# Patient Record
Sex: FEMALE | NOT HISPANIC OR LATINO | Employment: UNEMPLOYED | URBAN - METROPOLITAN AREA
[De-identification: names, ages, dates, MRNs, and addresses within clinical notes are randomized per-mention and may not be internally consistent; named-entity substitution may affect disease eponyms.]

---

## 2024-01-18 PROBLEM — Z01.419 WELL WOMAN EXAM: Status: ACTIVE | Noted: 2024-01-18

## 2024-01-18 NOTE — PROGRESS NOTES
"Assessment/Plan   There are no diagnoses linked to this encounter.    Carin Grewal, APRN-CNM     Subjective   Carine Rowell is a 38 y.o. female who is here for a routine exam. Periods are {gyn period regularity:715}, lasting {numbers; 0-10:01395} days. Dysmenorrhea:{gyn dysmenorrhea:716}. Cyclic symptoms include {sys gyn cyclic sx:77656}. No intermenstrual bleeding, spotting, or discharge.    Current contraception: {contraceptive method:5051}  History of abnormal Pap smear: {yes***/no:95018::\"no\"}  History of LEEP or cryo:  {yes***/no:53921::\"no\"}  Family history of uterine or ovarian cancer: {yes***/no:43177::\"no\"}  Family history of breast cancer: {yes***/no:78485::\"no\"}  Regular self breast exam: {yes/no:63}  History of abnormal mammogram: {yes***/no:07218::\"no\"}    She has issues with lubrication: {yes***/no:17777::\"no\"}  She reports issue with orgasms: {yes***/no:35831::\"no\"}  She reports pain with sexual activity: {yes***/no:53217::\"no\"}  She reports sexual activity with: (male***/female***/both:40971::\"no\"}    Menstrual History:  OB History    No obstetric history on file.        Menarche age: ***  No LMP recorded.         ROS    Objective   There were no vitals taken for this visit.    General:   Alert and oriented x 3   Heart:  Thyroid: Regular rate, rhythm  Euthyroid, normal shape and size   Lungs:  Breast: Clear to auscultation bilaterally  Symmetrical, no skin changes/nipple discharge, redness, tenderness, no masses palpated bilaterally   Abdomen: Soft, non tender   Vulva: EGBUS normal   Vagina: Pink, normal discharge   Cervix: No CMT   Uterus: Normal shape, size   Adnexa: NT bilaterally     "

## 2024-01-30 ENCOUNTER — APPOINTMENT (OUTPATIENT)
Dept: OBSTETRICS AND GYNECOLOGY | Facility: CLINIC | Age: 39
End: 2024-01-30
Payer: COMMERCIAL

## 2024-07-18 ENCOUNTER — APPOINTMENT (OUTPATIENT)
Dept: OBSTETRICS AND GYNECOLOGY | Facility: CLINIC | Age: 39
End: 2024-07-18
Payer: COMMERCIAL

## 2024-07-18 ENCOUNTER — PATIENT MESSAGE (OUTPATIENT)
Dept: OBSTETRICS AND GYNECOLOGY | Facility: CLINIC | Age: 39
End: 2024-07-18

## 2024-07-18 VITALS
SYSTOLIC BLOOD PRESSURE: 118 MMHG | DIASTOLIC BLOOD PRESSURE: 70 MMHG | WEIGHT: 151 LBS | HEIGHT: 67 IN | BODY MASS INDEX: 23.7 KG/M2

## 2024-07-18 DIAGNOSIS — Z01.419 ENCOUNTER FOR ANNUAL ROUTINE GYNECOLOGICAL EXAMINATION: ICD-10-CM

## 2024-07-18 DIAGNOSIS — F41.9 ANXIETY: Primary | ICD-10-CM

## 2024-07-18 PROCEDURE — 3008F BODY MASS INDEX DOCD: CPT | Performed by: ADVANCED PRACTICE MIDWIFE

## 2024-07-18 PROCEDURE — 1036F TOBACCO NON-USER: CPT | Performed by: ADVANCED PRACTICE MIDWIFE

## 2024-07-18 PROCEDURE — 99395 PREV VISIT EST AGE 18-39: CPT | Performed by: ADVANCED PRACTICE MIDWIFE

## 2024-07-18 ASSESSMENT — ENCOUNTER SYMPTOMS
NEUROLOGICAL NEGATIVE: 0
PSYCHIATRIC NEGATIVE: 0
HEMATOLOGIC/LYMPHATIC NEGATIVE: 0
EYES NEGATIVE: 0
ALLERGIC/IMMUNOLOGIC NEGATIVE: 0
RESPIRATORY NEGATIVE: 0
GASTROINTESTINAL NEGATIVE: 0
MUSCULOSKELETAL NEGATIVE: 0
CARDIOVASCULAR NEGATIVE: 0
CONSTITUTIONAL NEGATIVE: 0
ENDOCRINE NEGATIVE: 0

## 2024-07-18 ASSESSMENT — PAIN SCALES - GENERAL: PAINLEVEL: 0-NO PAIN

## 2024-07-18 NOTE — PROGRESS NOTES
"Assessment/Plan   Diagnoses and all orders for this visit:  Encounter for annual routine gynecological examination      BHARGAVI Magdaleno     Subjective   Carine Rowell is a 38 y.o. female who is here for a routine exam.     Concerns today:  Denies specific concerns today. Reports no new personal or relevant family history since last exam. Does exercise daily with walking, running, and weight lifting. Follows a vegetarian diet.     Patient's last menstrual period was 07/17/2024.   Periods are regular every 28-30 days, lasting  5-7  days.   Dysmenorrhea:none.   Cyclic symptoms include none.     Sexual Activity: sexually active, male partners; Patient reports 1 partners in the last 12 months.  Pain with intercourse? No   Loss of desire? No       History of prior STI: none    Current contraception: IUD, happy with paragard IUD    Last pap: 2020  History of abnormal Pap smear: no  Family history of uterine or ovarian cancer: no    Last mammogram: 10 years ago   History of abnormal mammogram: no  Family history of breast cancer: no  Menstrual History:  OB History    No obstetric history on file.          Patient's last menstrual period was 07/17/2024.Menstruation began yesterday.        Objective   /70 (BP Location: Right arm, Patient Position: Sitting, BP Cuff Size: Adult)   Ht 1.702 m (5' 7\")   Wt 68.5 kg (151 lb)   LMP 07/17/2024   BMI 23.65 kg/m²   Physical Exam  Constitutional:       Appearance: Normal appearance. She is normal weight.   Genitourinary:      Vulva, bladder and rectum normal.      No lesions in the vagina.      Right Labia: No rash, tenderness or lesions.     Left Labia: No tenderness, lesions or rash.     No vaginal discharge, erythema, tenderness, bleeding, ulceration or granulation tissue.      No vaginal prolapse present.     No vaginal atrophy present.       Right Adnexa: not tender, not full and no mass present.     Left Adnexa: not tender, not full and no mass present.     No " cervical motion tenderness, friability, lesion or polyp.      IUD strings visualized.      Uterus is not enlarged, tender or irregular.      No uterine mass detected.     Uterus is anteverted.   Breasts:     Breasts are soft.     Right: Normal.      Left: Normal.   HENT:      Mouth/Throat:      Mouth: Mucous membranes are dry.   Cardiovascular:      Rate and Rhythm: Normal rate and regular rhythm.   Pulmonary:      Effort: Pulmonary effort is normal.      Breath sounds: Normal breath sounds.   Abdominal:      General: Abdomen is flat. Bowel sounds are normal.      Palpations: Abdomen is soft.   Musculoskeletal:         General: Normal range of motion.      Cervical back: Normal range of motion.   Neurological:      Mental Status: She is alert.   Skin:     General: Skin is warm and dry.     RTO in 1 year for pap and annual exam   BHARGAVI Magdaleno

## 2024-07-23 RX ORDER — BUPROPION HYDROCHLORIDE 150 MG/1
150 TABLET ORAL DAILY
Qty: 90 TABLET | Refills: 3 | Status: SHIPPED | OUTPATIENT
Start: 2024-07-23 | End: 2025-07-23

## 2024-09-25 ENCOUNTER — PATIENT MESSAGE (OUTPATIENT)
Dept: OBSTETRICS AND GYNECOLOGY | Facility: CLINIC | Age: 39
End: 2024-09-25
Payer: COMMERCIAL

## 2024-09-25 DIAGNOSIS — F41.9 ANXIETY: ICD-10-CM

## 2024-09-25 RX ORDER — BUPROPION HYDROCHLORIDE 150 MG/1
150 TABLET ORAL DAILY
Qty: 90 TABLET | Refills: 3 | Status: SHIPPED | OUTPATIENT
Start: 2024-09-25 | End: 2025-09-25

## 2024-09-29 ENCOUNTER — OFFICE VISIT (OUTPATIENT)
Dept: URGENT CARE | Age: 39
End: 2024-09-29
Payer: COMMERCIAL

## 2024-09-29 VITALS
OXYGEN SATURATION: 98 % | SYSTOLIC BLOOD PRESSURE: 129 MMHG | HEART RATE: 75 BPM | RESPIRATION RATE: 15 BRPM | BODY MASS INDEX: 23.49 KG/M2 | DIASTOLIC BLOOD PRESSURE: 81 MMHG | WEIGHT: 150 LBS | TEMPERATURE: 98.3 F

## 2024-09-29 DIAGNOSIS — J40 BRONCHITIS: Primary | ICD-10-CM

## 2024-09-29 DIAGNOSIS — J06.9 UPPER RESPIRATORY TRACT INFECTION, UNSPECIFIED TYPE: ICD-10-CM

## 2024-09-29 PROCEDURE — 99204 OFFICE O/P NEW MOD 45 MIN: CPT | Performed by: PHYSICIAN ASSISTANT

## 2024-09-29 PROCEDURE — 1036F TOBACCO NON-USER: CPT | Performed by: PHYSICIAN ASSISTANT

## 2024-09-29 RX ORDER — ALBUTEROL SULFATE 90 UG/1
2 INHALANT RESPIRATORY (INHALATION) EVERY 4 HOURS PRN
Qty: 8 G | Refills: 0 | Status: SHIPPED | OUTPATIENT
Start: 2024-09-29 | End: 2025-09-29

## 2024-09-29 RX ORDER — BENZONATATE 100 MG/1
100 CAPSULE ORAL 3 TIMES DAILY PRN
Qty: 42 CAPSULE | Refills: 0 | Status: SHIPPED | OUTPATIENT
Start: 2024-09-29 | End: 2024-10-29

## 2024-09-29 RX ORDER — AMOXICILLIN 500 MG/1
500 CAPSULE ORAL 2 TIMES DAILY
Qty: 20 CAPSULE | Refills: 0 | Status: SHIPPED | OUTPATIENT
Start: 2024-09-29 | End: 2024-10-09

## 2024-09-29 ASSESSMENT — ENCOUNTER SYMPTOMS: COUGH: 1

## 2024-09-29 NOTE — PROGRESS NOTES
Subjective   Patient ID: Carine Rowell is a 39 y.o. female. They present today with a chief complaint of Cough (Pt c/o cough x 1 week progressively gotten worse. Pt states cough woke her up in middle of nite and felt like she couldn't catch her breath. ELEONORA, RN).    History of Present Illness    Cough        URI sxs and dry cough. SOB at night. Denies f/cp/syncope/palpitations.     Past Medical History  Allergies as of 2024 - Reviewed 2024   Allergen Reaction Noted    Oxycodone-acetaminophen Itching 2024       (Not in a hospital admission)       Past Medical History:   Diagnosis Date    Other specified health status 10/13/2017    No pertinent past medical history    Personal history of other specified conditions 10/13/2017    History of urinary frequency       Past Surgical History:   Procedure Laterality Date    OTHER SURGICAL HISTORY  10/12/2020     section    OTHER SURGICAL HISTORY  10/12/2020    Anterior cruciate ligament repair        reports that she has never smoked. She has never been exposed to tobacco smoke. She has never used smokeless tobacco. She reports that she does not use drugs.    Review of Systems  Review of Systems   HENT:  Positive for congestion.    Respiratory:  Positive for cough.    All other systems reviewed and are negative.                                 Objective    Vitals:    24 1036   BP: 129/81   BP Location: Left arm   Patient Position: Sitting   BP Cuff Size: Adult long   Pulse: 75   Resp: 15   Temp: 36.8 °C (98.3 °F)   TempSrc: Oral   SpO2: 98%   Weight: 68 kg (150 lb)     No LMP recorded.    Physical Exam  Vitals and nursing note reviewed.   Constitutional:       Appearance: Normal appearance.   HENT:      Mouth/Throat:      Pharynx: Posterior oropharyngeal erythema present.   Eyes:      Conjunctiva/sclera: Conjunctivae normal.   Cardiovascular:      Rate and Rhythm: Normal rate.   Pulmonary:      Effort: Pulmonary effort is normal.   Neurological:       Mental Status: She is alert.   Psychiatric:         Mood and Affect: Mood normal.         Procedures    Point of Care Test & Imaging Results from this visit  No results found for this visit on 09/29/24.   No results found.    Diagnostic study results (if any) were reviewed by Jourdan Bartlett PA-C.    Assessment/Plan   Allergies, medications, history, and pertinent labs/EKGs/Imaging reviewed by Jourdan Bartlett PA-C.     Medical Decision Making  Bronchitis, albuterol, tessalon perle    Strep and covid less likely based on hx and physical    Stable vs  No resp distress    Orders and Diagnoses  Diagnoses and all orders for this visit:  Bronchitis  -     benzonatate (Tessalon) 100 mg capsule; Take 1 capsule (100 mg) by mouth 3 times a day as needed for cough. Do not crush or chew.  -     albuterol (Ventolin HFA) 90 mcg/actuation inhaler; Inhale 2 puffs every 4 hours if needed for wheezing or shortness of breath.  -     amoxicillin (Amoxil) 500 mg capsule; Take 1 capsule (500 mg) by mouth 2 times a day for 10 days.      Medical Admin Record      Patient disposition: Home    Electronically signed by Jourdan Bartlett PA-C  11:09 AM

## 2024-10-29 ENCOUNTER — APPOINTMENT (OUTPATIENT)
Dept: PRIMARY CARE | Facility: CLINIC | Age: 39
End: 2024-10-29
Payer: COMMERCIAL

## 2024-12-30 ENCOUNTER — OFFICE VISIT (OUTPATIENT)
Dept: OBSTETRICS AND GYNECOLOGY | Facility: CLINIC | Age: 39
End: 2024-12-30
Payer: COMMERCIAL

## 2024-12-30 VITALS — BODY MASS INDEX: 23.49 KG/M2 | SYSTOLIC BLOOD PRESSURE: 114 MMHG | DIASTOLIC BLOOD PRESSURE: 74 MMHG | HEIGHT: 67 IN

## 2024-12-30 DIAGNOSIS — N94.89 LABIAL SWELLING: Primary | ICD-10-CM

## 2024-12-30 DIAGNOSIS — N75.0 BARTHOLIN'S CYST: ICD-10-CM

## 2024-12-30 PROCEDURE — 1036F TOBACCO NON-USER: CPT | Performed by: ADVANCED PRACTICE MIDWIFE

## 2024-12-30 PROCEDURE — 99212 OFFICE O/P EST SF 10 MIN: CPT | Performed by: ADVANCED PRACTICE MIDWIFE

## 2024-12-30 RX ORDER — SULFAMETHOXAZOLE AND TRIMETHOPRIM 800; 160 MG/1; MG/1
1 TABLET ORAL 2 TIMES DAILY
Qty: 10 TABLET | Refills: 0 | Status: SHIPPED | OUTPATIENT
Start: 2024-12-30 | End: 2025-01-04

## 2024-12-30 ASSESSMENT — ENCOUNTER SYMPTOMS
CARDIOVASCULAR NEGATIVE: 0
PSYCHIATRIC NEGATIVE: 0
ALLERGIC/IMMUNOLOGIC NEGATIVE: 0
NEUROLOGICAL NEGATIVE: 0
MUSCULOSKELETAL NEGATIVE: 0
EYES NEGATIVE: 0
HEMATOLOGIC/LYMPHATIC NEGATIVE: 0
ENDOCRINE NEGATIVE: 0
GASTROINTESTINAL NEGATIVE: 0
RESPIRATORY NEGATIVE: 0
CONSTITUTIONAL NEGATIVE: 0

## 2024-12-30 ASSESSMENT — PAIN SCALES - GENERAL: PAINLEVEL_OUTOF10: 2

## 2025-02-06 ENCOUNTER — APPOINTMENT (OUTPATIENT)
Dept: PRIMARY CARE | Facility: CLINIC | Age: 40
End: 2025-02-06
Payer: COMMERCIAL

## 2025-05-12 ENCOUNTER — OFFICE VISIT (OUTPATIENT)
Dept: URGENT CARE | Age: 40
End: 2025-05-12
Payer: COMMERCIAL

## 2025-05-12 VITALS
OXYGEN SATURATION: 98 % | HEART RATE: 76 BPM | SYSTOLIC BLOOD PRESSURE: 134 MMHG | DIASTOLIC BLOOD PRESSURE: 88 MMHG | TEMPERATURE: 98.2 F | RESPIRATION RATE: 16 BRPM

## 2025-05-12 DIAGNOSIS — J02.9 SORE THROAT: ICD-10-CM

## 2025-05-12 DIAGNOSIS — R05.9 COUGH, UNSPECIFIED TYPE: ICD-10-CM

## 2025-05-12 DIAGNOSIS — H66.92 LEFT OTITIS MEDIA, UNSPECIFIED OTITIS MEDIA TYPE: Primary | ICD-10-CM

## 2025-05-12 LAB
POC HUMAN RHINOVIRUS PCR: NEGATIVE
POC INFLUENZA A VIRUS PCR: NEGATIVE
POC INFLUENZA B VIRUS PCR: NEGATIVE
POC RESPIRATORY SYNCYTIAL VIRUS PCR: NEGATIVE
POC SARS-COV-2 AG BINAX: NORMAL
POC STREPTOCOCCUS PYOGENES (GROUP A STREP) PCR: NEGATIVE

## 2025-05-12 PROCEDURE — 87651 STREP A DNA AMP PROBE: CPT | Performed by: NURSE PRACTITIONER

## 2025-05-12 PROCEDURE — 99213 OFFICE O/P EST LOW 20 MIN: CPT | Performed by: NURSE PRACTITIONER

## 2025-05-12 PROCEDURE — 87631 RESP VIRUS 3-5 TARGETS: CPT | Performed by: NURSE PRACTITIONER

## 2025-05-12 PROCEDURE — 1036F TOBACCO NON-USER: CPT | Performed by: NURSE PRACTITIONER

## 2025-05-12 RX ORDER — AMOXICILLIN AND CLAVULANATE POTASSIUM 875; 125 MG/1; MG/1
875 TABLET, FILM COATED ORAL 2 TIMES DAILY
Qty: 20 TABLET | Refills: 0 | Status: SHIPPED | OUTPATIENT
Start: 2025-05-12

## 2025-05-12 ASSESSMENT — ENCOUNTER SYMPTOMS
SORE THROAT: 1
GASTROINTESTINAL NEGATIVE: 1
CARDIOVASCULAR NEGATIVE: 1
FEVER: 0
COUGH: 1
CHILLS: 1
RHINORRHEA: 1
STRIDOR: 0
WHEEZING: 0
EYES NEGATIVE: 1
FATIGUE: 1
SHORTNESS OF BREATH: 0

## 2025-05-12 ASSESSMENT — PAIN SCALES - GENERAL: PAINLEVEL_OUTOF10: 4

## 2025-05-12 NOTE — PROGRESS NOTES
Subjective   Patient ID: Carine Rowell is a 39 y.o. female. They present today with a chief complaint of Earache (yesterday), Sore Throat, and Cough (For a few days ).    History of Present Illness  Patient presents with sore throat, cough. And congestion for a few days. It states the ear pain started yesterday. States it kept her awake. Currently rates the pain 4 out of 10, but states it felt like it was a pain and pressure.     Past Medical History  Allergies as of 05/12/2025 - Reviewed 05/12/2025   Allergen Reaction Noted    Oxycodone-acetaminophen Itching 07/18/2024       Prescriptions Prior to Admission[1]     Medical History[2]    Surgical History[3]     reports that she has never smoked. She has never been exposed to tobacco smoke. She has never used smokeless tobacco. She reports that she does not use drugs.    Review of Systems  Review of Systems   Constitutional:  Positive for chills and fatigue. Negative for fever.   HENT:  Positive for congestion, ear pain, postnasal drip, rhinorrhea and sore throat. Negative for ear discharge.    Eyes: Negative.    Respiratory:  Positive for cough. Negative for shortness of breath, wheezing and stridor.    Cardiovascular: Negative.    Gastrointestinal: Negative.                                   Objective    Vitals:    05/12/25 0853   BP: 134/88   Pulse: 76   Resp: 16   Temp: 36.8 °C (98.2 °F)   TempSrc: Oral   SpO2: 98%     No LMP recorded (lmp unknown).    Physical Exam  CONSTITUTIONAL:  Appears ill    ENT: Bilateral swelling and redness to nasal turbinate's.  Erythema with pebbling to throat.         Lymph: Bilateral cervical lymph edema     CARDIOVASCULAR: The patient's heart examined for regular rate and rhythm and presence of murmurs. Note taken of any tachycardia, bradycardia or any irregular rhythm.  No abnormal findings.        RESPIRATORY/LUNGS: Chest examined for equal movement, bilaterally.  Lungs examined for equality of breath sounds. Presence or absence of  rales noted bilaterally.  Examined for the presence of diffuse or scattered wheezes.  No abnormal findings.      EARS: External appearance normal-no lesions, redness, or swelling. Mild discomfort during palpation; on otoscopic exam tympanic membranes and inner ear are red, and fluid is also noted behind the tympanic membrane. Hearing is intact.       Procedures    Point of Care Test & Imaging Results from this visit  No results found for this visit on 05/12/25.   Imaging  No results found.    Cardiology, Vascular, and Other Imaging  No other imaging results found for the past 2 days      Diagnostic study results (if any) were reviewed by PHILLY Walsh.    Assessment/Plan   Allergies, medications, history, and pertinent labs/EKGs/Imaging reviewed by PHILLY Walsh.     Medical Decision Making  Left Otitis Media. We will treat with antibiotics as prescribed and comfort measures such as ibuprofen and acetaminophen. The antibiotics will likely only treat the ear pain from the infection. Coughing and congestion are still viral in nature and will take longer to improve. Can use honey based cough medicine (zarbee's or hylands) for the cough. Cool mist humidifier and steamy showers can help too. If the pain is not improving in 48 hours, call back.     At time of discharge patient was clinically well-appearing and HDS for outpatient management. The patient and/or family was educated regarding diagnosis, supportive care, OTC and Rx medications. The patient and/or family was given the opportunity to ask questions prior to discharge.  They verbalized understanding of my discussion of the plans for treatment, expected course, indications to return to  or seek further evaluation in ED, and the need for timely follow up as directed.   They were provided with a work/school excuse if requested.    Orders and Diagnoses  Diagnoses and all orders for this visit:  Cough, unspecified type  -     POCT SPOTFIRE R/ST  Panel Mini w/Strep A (Noomeo) manually resulted  -     POCT Covid-19 Rapid Antigen  Sore throat  -     POCT SPOTFIRE R/ST Panel Mini w/Strep A (Noomeo) manually resulted  -     POCT Covid-19 Rapid Antigen      Medical Admin Record      Patient disposition: Home    Electronically signed by PHILLY Walsh  8:58 AM           [1] (Not in a hospital admission)  [2]   Past Medical History:  Diagnosis Date    Other specified health status 10/13/2017    No pertinent past medical history    Personal history of other specified conditions 10/13/2017    History of urinary frequency   [3]   Past Surgical History:  Procedure Laterality Date    OTHER SURGICAL HISTORY  10/12/2020     section    OTHER SURGICAL HISTORY  10/12/2020    Anterior cruciate ligament repair

## 2025-07-05 DIAGNOSIS — F41.9 ANXIETY: ICD-10-CM

## 2025-07-07 RX ORDER — BUPROPION HYDROCHLORIDE 300 MG/1
300 TABLET ORAL DAILY
Qty: 90 TABLET | Refills: 3 | Status: SHIPPED | OUTPATIENT
Start: 2025-07-07 | End: 2026-07-07

## 2025-07-23 DIAGNOSIS — F41.9 ANXIETY: ICD-10-CM

## 2025-07-23 RX ORDER — BUPROPION HYDROCHLORIDE 150 MG/1
150 TABLET ORAL DAILY
Qty: 90 TABLET | Refills: 3 | OUTPATIENT
Start: 2025-07-23 | End: 2026-07-23

## 2025-09-18 ENCOUNTER — APPOINTMENT (OUTPATIENT)
Dept: PRIMARY CARE | Facility: CLINIC | Age: 40
End: 2025-09-18
Payer: COMMERCIAL